# Patient Record
Sex: MALE | Race: WHITE | URBAN - METROPOLITAN AREA
[De-identification: names, ages, dates, MRNs, and addresses within clinical notes are randomized per-mention and may not be internally consistent; named-entity substitution may affect disease eponyms.]

---

## 2022-06-02 ENCOUNTER — WEB ENCOUNTER (OUTPATIENT)
Dept: URBAN - METROPOLITAN AREA CLINIC 54 | Facility: CLINIC | Age: 77
End: 2022-06-02

## 2022-06-02 ENCOUNTER — LAB OUTSIDE AN ENCOUNTER (OUTPATIENT)
Dept: URBAN - METROPOLITAN AREA CLINIC 54 | Facility: CLINIC | Age: 77
End: 2022-06-02

## 2022-06-02 ENCOUNTER — OFFICE VISIT (OUTPATIENT)
Dept: URBAN - METROPOLITAN AREA CLINIC 54 | Facility: CLINIC | Age: 77
End: 2022-06-02
Payer: MEDICARE

## 2022-06-02 ENCOUNTER — TELEPHONE ENCOUNTER (OUTPATIENT)
Dept: URBAN - METROPOLITAN AREA CLINIC 54 | Facility: CLINIC | Age: 77
End: 2022-06-02

## 2022-06-02 ENCOUNTER — DASHBOARD ENCOUNTERS (OUTPATIENT)
Age: 77
End: 2022-06-02

## 2022-06-02 VITALS
WEIGHT: 158.6 LBS | HEIGHT: 71 IN | HEART RATE: 60 BPM | SYSTOLIC BLOOD PRESSURE: 145 MMHG | BODY MASS INDEX: 22.2 KG/M2 | TEMPERATURE: 98.1 F | DIASTOLIC BLOOD PRESSURE: 77 MMHG

## 2022-06-02 DIAGNOSIS — R10.13 EPIGASTRIC PAIN: ICD-10-CM

## 2022-06-02 DIAGNOSIS — R63.4 WEIGHT LOSS: ICD-10-CM

## 2022-06-02 DIAGNOSIS — K86.89 MASS OF PANCREAS: ICD-10-CM

## 2022-06-02 DIAGNOSIS — R13.19 CERVICAL DYSPHAGIA: ICD-10-CM

## 2022-06-02 PROBLEM — 40739000: Status: ACTIVE | Noted: 2022-06-02

## 2022-06-02 PROCEDURE — 99204 OFFICE O/P NEW MOD 45 MIN: CPT | Performed by: STUDENT IN AN ORGANIZED HEALTH CARE EDUCATION/TRAINING PROGRAM

## 2022-06-02 PROCEDURE — 99244 OFF/OP CNSLTJ NEW/EST MOD 40: CPT | Performed by: STUDENT IN AN ORGANIZED HEALTH CARE EDUCATION/TRAINING PROGRAM

## 2022-06-02 RX ORDER — LISINOPRIL 5 MG/1
1 TABLET TABLET ORAL ONCE A DAY
Status: ACTIVE | COMMUNITY

## 2022-06-02 RX ORDER — OXYBUTYNIN CHLORIDE 5 MG/1
1 TABLET TABLET, EXTENDED RELEASE ORAL TWICE DAILY
Status: ACTIVE | COMMUNITY

## 2022-06-02 RX ORDER — ATENOLOL 50 MG/1
1 TABLET TABLET ORAL ONCE A DAY
Status: ACTIVE | COMMUNITY

## 2022-06-02 RX ORDER — PRAVASTATIN SODIUM 10 MG/1
1 TABLET TABLET ORAL ONCE A DAY
Status: ACTIVE | COMMUNITY

## 2022-06-02 RX ORDER — FENOFIBRATE 160 MG/1
1 TABLET TABLET ORAL ONCE A DAY
Status: ACTIVE | COMMUNITY

## 2022-06-02 RX ORDER — LINAGLIPTIN 5 MG/1
1 TABLET TABLET, FILM COATED ORAL ONCE A DAY
Status: ACTIVE | COMMUNITY

## 2022-06-02 RX ORDER — ASPIRIN 81 MG/1
1 TABLET TABLET, COATED ORAL ONCE A DAY
Status: ACTIVE | COMMUNITY

## 2022-06-02 RX ORDER — BUSPIRONE HYDROCHLORIDE 10 MG/1
1 TABLET TABLET ORAL TWICE A DAY
Status: ACTIVE | COMMUNITY

## 2022-06-02 NOTE — HPI-TODAY'S VISIT:
Mr. Ranjit Peacock is a 76-year-old man with a history of CKD stage IIIb, hyperlipidemia, and type 2 diabetes mellitus who was referred by Dr. Stevie Draper for pancreas mass. Patient had CT abdomen and pelvis without contrast on 6/1/2022 to evaluate for unintentional weight loss.  The study showed 5.5 cm x 4 cm x 3.5 cm ill-defined mass from the junction of the neck/body of the pancreas with continuous stranding that extended at least 180 degrees about the adjacent SMA.  Exam was also notable for a 15 mm cyst in the right hepatic lobe. Patient complains of a 25 pound weight loss in 2 months, back pain, abdominal pain.  Per wife, patient has some mild yellowing of the skin. Of note, patient also complains of history of dysphagia.  He had barium swallow which showed no signs of stricture.  He states symptoms of dysphagia improved after starting omeprazole daily. He also complains of early satiety.  Patient had gastric emptying study which was normal. Patient also complains of gnawing abdominal pain for which she is taking tramadol. Patient also has new onset back pain.   Patient is not on anticoagulants/antiplatelets, have a pacemaker/defibrillator, cardiac arrythmia or heart failure. The patient has not had a recent (<3 months) MI, CVA, TIA, CAD/stents.  Patient nara not have cardiac valvulopathy, history of anesthesia complications including high fever with anesthesia.  The patient is not on dialysis and does not have a seizure disorder, need for continuous home O2, severe pulmonary disease, or need for assistance with walking or transferring from surface-to-surface. The patient does not have a BMI of 50 or above.

## 2022-06-02 NOTE — EXAM-PHYSICAL EXAM
General:  No acute distress. HEENT: Anicteric sclerae Cardiovascular: Normal heart rate Respiratory: Breathing comfortably without conversational dyspnea Abdomen:  non-distended, soft, non-tender, no palpable mass/lymphadenopathy Rectal: Deferred Skin: without visible rashes or jaundice on examined areas. Musculoskeletal: MUSCLE WASTING in legs, ambulated without difficulty. Can stand from sitting position without assistance. Neuro: No gross focal deficits. Alert and oriented. Psych: Appropriate mood and affect.

## 2022-06-07 ENCOUNTER — OFFICE VISIT (OUTPATIENT)
Dept: URBAN - METROPOLITAN AREA MEDICAL CENTER 23 | Facility: MEDICAL CENTER | Age: 77
End: 2022-06-07
Payer: MEDICARE

## 2022-06-07 ENCOUNTER — TELEPHONE ENCOUNTER (OUTPATIENT)
Dept: URBAN - METROPOLITAN AREA CLINIC 54 | Facility: CLINIC | Age: 77
End: 2022-06-07

## 2022-06-07 DIAGNOSIS — R93.3 ABN FINDINGS-GI TRACT: ICD-10-CM

## 2022-06-07 DIAGNOSIS — C25.1 CANCER, PANCREAS, BODY: ICD-10-CM

## 2022-06-07 PROBLEM — 187791002: Status: ACTIVE | Noted: 2022-06-07

## 2022-06-07 LAB
A/G RATIO: 1.8
ALBUMIN: 4.5
ALKALINE PHOSPHATASE: 47
ALT (SGPT): 18
AST (SGOT): 27
BILIRUBIN, TOTAL: 0.4
BUN/CREATININE RATIO: 14
BUN: 23
CA 19-9: 183
CALCIUM: 10.2
CARBON DIOXIDE, TOTAL: 19
CHLORIDE: 97
CREATININE: 1.7
EGFR: 41
GLOBULIN, TOTAL: 2.5
GLUCOSE: 212
HEMOGLOBIN A1C: 7.6
POTASSIUM: 4.8
PROTEIN, TOTAL: 7
SODIUM: 135

## 2022-06-07 PROCEDURE — 43242 EGD US FINE NEEDLE BX/ASPIR: CPT | Performed by: INTERNAL MEDICINE

## 2022-06-07 RX ORDER — LISINOPRIL 5 MG/1
1 TABLET TABLET ORAL ONCE A DAY
Status: ACTIVE | COMMUNITY

## 2022-06-07 RX ORDER — ASPIRIN 81 MG/1
1 TABLET TABLET, COATED ORAL ONCE A DAY
Status: ACTIVE | COMMUNITY

## 2022-06-07 RX ORDER — LINAGLIPTIN 5 MG/1
1 TABLET TABLET, FILM COATED ORAL ONCE A DAY
Status: ACTIVE | COMMUNITY

## 2022-06-07 RX ORDER — FENOFIBRATE 160 MG/1
1 TABLET TABLET ORAL ONCE A DAY
Status: ACTIVE | COMMUNITY

## 2022-06-07 RX ORDER — ATENOLOL 50 MG/1
1 TABLET TABLET ORAL ONCE A DAY
Status: ACTIVE | COMMUNITY

## 2022-06-07 RX ORDER — BUSPIRONE HYDROCHLORIDE 10 MG/1
1 TABLET TABLET ORAL TWICE A DAY
Status: ACTIVE | COMMUNITY

## 2022-06-07 RX ORDER — OXYBUTYNIN CHLORIDE 5 MG/1
1 TABLET TABLET, EXTENDED RELEASE ORAL TWICE DAILY
Status: ACTIVE | COMMUNITY

## 2022-06-07 RX ORDER — PRAVASTATIN SODIUM 10 MG/1
1 TABLET TABLET ORAL ONCE A DAY
Status: ACTIVE | COMMUNITY

## 2022-06-10 ENCOUNTER — TELEPHONE ENCOUNTER (OUTPATIENT)
Dept: URBAN - METROPOLITAN AREA CLINIC 54 | Facility: CLINIC | Age: 77
End: 2022-06-10